# Patient Record
Sex: FEMALE | Race: WHITE | NOT HISPANIC OR LATINO | Employment: FULL TIME | URBAN - METROPOLITAN AREA
[De-identification: names, ages, dates, MRNs, and addresses within clinical notes are randomized per-mention and may not be internally consistent; named-entity substitution may affect disease eponyms.]

---

## 2023-04-11 ENCOUNTER — HOSPITAL ENCOUNTER (EMERGENCY)
Facility: CLINIC | Age: 43
Discharge: HOME | End: 2023-04-11
Attending: EMERGENCY MEDICINE | Admitting: EMERGENCY MEDICINE
Payer: COMMERCIAL

## 2023-04-11 VITALS
SYSTOLIC BLOOD PRESSURE: 124 MMHG | DIASTOLIC BLOOD PRESSURE: 80 MMHG | TEMPERATURE: 97 F | RESPIRATION RATE: 22 BRPM | HEART RATE: 77 BPM | OXYGEN SATURATION: 95 %

## 2023-04-11 DIAGNOSIS — M77.51 TENDINITIS OF RIGHT FOOT: ICD-10-CM

## 2023-04-11 PROCEDURE — 250N000013 HC RX MED GY IP 250 OP 250 PS 637: Performed by: EMERGENCY MEDICINE

## 2023-04-11 PROCEDURE — 250N000012 HC RX MED GY IP 250 OP 636 PS 637: Performed by: EMERGENCY MEDICINE

## 2023-04-11 PROCEDURE — 99284 EMERGENCY DEPT VISIT MOD MDM: CPT | Mod: 25

## 2023-04-11 RX ORDER — KETOROLAC TROMETHAMINE 10 MG/1
10 TABLET, FILM COATED ORAL EVERY 6 HOURS PRN
Qty: 20 TABLET | Refills: 0 | Status: SHIPPED | OUTPATIENT
Start: 2023-04-11

## 2023-04-11 RX ORDER — HYDROCODONE BITARTRATE AND ACETAMINOPHEN 5; 325 MG/1; MG/1
2 TABLET ORAL ONCE
Status: COMPLETED | OUTPATIENT
Start: 2023-04-11 | End: 2023-04-11

## 2023-04-11 RX ORDER — PREDNISONE 20 MG/1
40 TABLET ORAL ONCE
Status: COMPLETED | OUTPATIENT
Start: 2023-04-11 | End: 2023-04-11

## 2023-04-11 RX ORDER — PREDNISONE 20 MG/1
40 TABLET ORAL DAILY
Qty: 12 TABLET | Refills: 0 | Status: SHIPPED | OUTPATIENT
Start: 2023-04-12 | End: 2023-04-18

## 2023-04-11 RX ADMIN — PREDNISONE 40 MG: 20 TABLET ORAL at 13:15

## 2023-04-11 RX ADMIN — HYDROCODONE BITARTRATE AND ACETAMINOPHEN 2 TABLET: 5; 325 TABLET ORAL at 13:15

## 2023-04-11 ASSESSMENT — ENCOUNTER SYMPTOMS
ARTHRALGIAS: 1
NUMBNESS: 0
WEAKNESS: 0

## 2023-04-11 ASSESSMENT — ACTIVITIES OF DAILY LIVING (ADL): ADLS_ACUITY_SCORE: 33

## 2023-04-11 NOTE — ED PROVIDER NOTES
History     Chief Complaint:  Foot Pain     The history is provided by the patient.      Solange Latif is a 42 year old female with no history of arthritis who presents with pain to her right lateral foot which has been persistent since completing a 5-mile walk yesterday. She reports that yesterday, she went for a 5-mile walk which is further than she would typically walk but did not sustain any overt trauma. In addition to this, she did not wear supportive footwear during her walk. She states that she felt well after her walk, but when she began stretching shortly after she was done walking, her right foot began cramping. She immediately tried to massage her foot, but the pain persisted. She later tried soaking her foot and applying tiger balm, but her foot pain continued getting worse. She notes increased pain when holding her foot off of the ground as well as with certain flexion/extension of her foot. She was seen at [facility] urgent care yesterday where an X-ray was obtained without evidence of fracture. She was sent home where she tried managing her pain with whiskey and codeine, and she was ultimately able to sleep after this. Ibuprofen has not been helpful. However, her pain was still present upon waking this morning, prompting her presentation to the ED. She denies associated numbness or weakness. She reports that she is otherwise healthy and has no history of arthritis. No daily medications or medication allergies.    Independent Historian:    None - Patient Only    Review of External Notes: None     ROS:  Review of Systems   Musculoskeletal: Positive for arthralgias.   Neurological: Negative for weakness and numbness.   All other systems reviewed and are negative.    Allergies:  The patient has no known allergies.    Medications:    The patient is currently on no regular medications.     Past Medical History:    The patient denies past medical history.     Social History:  The patient presents to the ED  with her spouse.  The patient presents to the ED via private car.     Physical Exam     Patient Vitals for the past 24 hrs:   BP Temp Pulse Resp SpO2   04/11/23 1316 -- -- -- -- 95 %   04/11/23 1315 124/80 -- 77 -- --   04/11/23 1126 119/78 97  F (36.1  C) 77 22 97 %      Physical Exam      EYES:   Conjunctiva normal.  NECK:    Supple, no meningismus.   CV:     Regular rate and rhythm     No murmurs, rubs or gallops.       2+ DP pulses bilateral.  PULM:    Clear to auscultation bilateral.       No respiratory distress.      No wheezing, rales or stridor.  ABD:    Soft, non-tender, non-distended.       No rebound or guarding.  MSK:     Right lower extremity : No gross deformity.       No tenderness to the proximal fibula.      Vance test within normal limits.        Achilles tendon is palpated without tenderness and without defect.           No bony tenderness to the ankle      No tenderness to the plantar fascia      Moderate focal tenderness to the dorsum of the lateral midfoot which is worsened by resisted dorsiflexion and extension of the toes  LYMPH:   No cervical lymphadenopathy.  NEURO:   Alert.      Right lower extremity:      Strength and sensation intact.  SKIN:    Warm, dry   PSYCH:    Mood is good and affect is appropriate.      Emergency Department Course     Emergency Department Course & Assessments:       Interventions:  Medications   HYDROcodone-acetaminophen (NORCO) 5-325 MG per tablet 2 tablet (2 tablets Oral $Given 4/11/23 1315)   predniSONE (DELTASONE) tablet 40 mg (40 mg Oral $Given 4/11/23 1315)     Social Determinants of Health affecting care:  None    Assessments:  1253 I obtained history and performed an examination of the patient. I discussed examination results and plan of care.    Disposition:  The patient was discharged to home.     Impression & Plan      Medical Decision Making:    Solange Latif is a 42 year old female presents with atraumatic right foot pain.  X-rays at clinic  yesterday were reportedly normal.  She has no evidence of arterial insufficiency, venous thrombosis, soft tissue infection, Achilles tendinitis or planter fasciitis.  Pain isolated to the lateral mid foot and worsened with resisted dorsiflexion.  Evaluation most consistent with tendinitis.  Given the severity of her symptoms, patient will be placed in Ortho boot for short-term immobilization, short burst of prednisone and follow-up with primary care physician.    Diagnosis:    ICD-10-CM    1. Tendinitis of right foot  M77.51 Ankle/Foot Bracing Supplies DME Walking Boot; Right; Non-pneumatic         Discharge Medications:  New Prescriptions    KETOROLAC (TORADOL) 10 MG TABLET    Take 1 tablet (10 mg) by mouth every 6 hours as needed for moderate pain    PREDNISONE (DELTASONE) 20 MG TABLET    Take 2 tablets (40 mg) by mouth daily for 6 days      Scribe Disclosure:  I, Yuko Gramajo, am serving as a scribe at 1:13 PM on 4/11/2023 to document services personally performed by Elpidio Del Rio MD based on my observations and the provider's statements to me.     4/11/2023   MD Quang Cummins Jeremiah R, MD  04/11/23 1522

## 2023-04-11 NOTE — ED TRIAGE NOTES
Pt presents to ED with right lateral foot pain. States she went on a five mile walk yesterday which was more than she would normally walk and didn't wear great shoes.The pain started after this walk.  States she was seen at  yesterday and xrays were negative for fracture. Took ibuprofen this morning without relief. Painful to touch and to bear weight.

## 2023-04-11 NOTE — DISCHARGE INSTRUCTIONS
Please make an appointment to follow up with your primary care provider in 7 days even if entirely better.